# Patient Record
Sex: FEMALE | Race: BLACK OR AFRICAN AMERICAN | Employment: UNEMPLOYED | URBAN - METROPOLITAN AREA
[De-identification: names, ages, dates, MRNs, and addresses within clinical notes are randomized per-mention and may not be internally consistent; named-entity substitution may affect disease eponyms.]

---

## 2017-02-17 DIAGNOSIS — J20.9 ACUTE BRONCHITIS, UNSPECIFIED ORGANISM: Primary | ICD-10-CM

## 2017-03-24 ENCOUNTER — OFFICE VISIT (OUTPATIENT)
Dept: CARDIOLOGY CLINIC | Age: 52
End: 2017-03-24

## 2017-03-24 VITALS
WEIGHT: 217 LBS | HEIGHT: 65 IN | HEART RATE: 72 BPM | SYSTOLIC BLOOD PRESSURE: 120 MMHG | DIASTOLIC BLOOD PRESSURE: 68 MMHG | BODY MASS INDEX: 36.15 KG/M2

## 2017-03-24 DIAGNOSIS — E78.5 HYPERLIPIDEMIA, UNSPECIFIED HYPERLIPIDEMIA TYPE: ICD-10-CM

## 2017-03-24 DIAGNOSIS — R06.02 SHORTNESS OF BREATH: Primary | ICD-10-CM

## 2017-03-24 DIAGNOSIS — I10 ESSENTIAL HYPERTENSION: ICD-10-CM

## 2017-03-24 PROCEDURE — 99213 OFFICE O/P EST LOW 20 MIN: CPT | Performed by: NURSE PRACTITIONER

## 2017-03-24 RX ORDER — LOSARTAN POTASSIUM 50 MG/1
50 TABLET ORAL DAILY
Qty: 90 TABLET | Refills: 1 | Status: SHIPPED | OUTPATIENT
Start: 2017-03-24 | End: 2018-01-11 | Stop reason: SDUPTHER

## 2017-03-24 RX ORDER — METOPROLOL SUCCINATE 50 MG/1
50 TABLET, EXTENDED RELEASE ORAL DAILY
Qty: 90 TABLET | Refills: 1 | Status: SHIPPED | OUTPATIENT
Start: 2017-03-24 | End: 2018-01-11 | Stop reason: SDUPTHER

## 2017-03-24 RX ORDER — POTASSIUM CHLORIDE 20 MEQ/1
20 TABLET, EXTENDED RELEASE ORAL DAILY
Qty: 90 TABLET | Refills: 1 | Status: SHIPPED | OUTPATIENT
Start: 2017-03-24

## 2017-03-24 RX ORDER — PREGABALIN 75 MG/1
75 CAPSULE ORAL 2 TIMES DAILY
COMMUNITY
End: 2017-07-17

## 2017-03-24 RX ORDER — FUROSEMIDE 20 MG/1
20 TABLET ORAL DAILY
Qty: 90 TABLET | Refills: 1 | Status: SHIPPED | OUTPATIENT
Start: 2017-03-24 | End: 2017-05-16 | Stop reason: SDUPTHER

## 2017-04-07 ENCOUNTER — TELEPHONE (OUTPATIENT)
Dept: PULMONOLOGY | Age: 52
End: 2017-04-07

## 2017-04-07 DIAGNOSIS — J96.01 ACUTE RESPIRATORY FAILURE WITH HYPOXIA (HCC): Primary | ICD-10-CM

## 2017-05-14 DIAGNOSIS — R06.02 SHORTNESS OF BREATH: ICD-10-CM

## 2017-05-15 RX ORDER — FUROSEMIDE 40 MG/1
TABLET ORAL
Qty: 30 TABLET | Refills: 5 | OUTPATIENT
Start: 2017-05-15

## 2017-05-16 RX ORDER — FUROSEMIDE 20 MG/1
20 TABLET ORAL DAILY
Qty: 30 TABLET | Refills: 6 | Status: SHIPPED | OUTPATIENT
Start: 2017-05-16 | End: 2018-01-17 | Stop reason: SDUPTHER

## 2017-07-03 ENCOUNTER — OFFICE VISIT (OUTPATIENT)
Dept: PULMONOLOGY | Age: 52
End: 2017-07-03

## 2017-07-03 VITALS
SYSTOLIC BLOOD PRESSURE: 108 MMHG | BODY MASS INDEX: 36.15 KG/M2 | RESPIRATION RATE: 16 BRPM | OXYGEN SATURATION: 96 % | HEART RATE: 64 BPM | DIASTOLIC BLOOD PRESSURE: 70 MMHG | WEIGHT: 217 LBS | HEIGHT: 65 IN | TEMPERATURE: 97.7 F

## 2017-07-03 DIAGNOSIS — R59.0 MEDIASTINAL ADENOPATHY: ICD-10-CM

## 2017-07-03 DIAGNOSIS — R06.02 SHORTNESS OF BREATH: Primary | ICD-10-CM

## 2017-07-03 PROCEDURE — 99213 OFFICE O/P EST LOW 20 MIN: CPT | Performed by: INTERNAL MEDICINE

## 2017-07-03 RX ORDER — GABAPENTIN 600 MG/1
TABLET ORAL
COMMUNITY
Start: 2017-06-14 | End: 2017-09-11 | Stop reason: ALTCHOICE

## 2017-07-03 RX ORDER — IBUPROFEN 800 MG/1
TABLET ORAL
COMMUNITY
Start: 2017-04-17

## 2017-07-10 ENCOUNTER — HOSPITAL ENCOUNTER (OUTPATIENT)
Dept: PULMONOLOGY | Age: 52
Discharge: HOME | End: 2017-07-07

## 2017-07-17 ENCOUNTER — OFFICE VISIT (OUTPATIENT)
Dept: PULMONOLOGY | Age: 52
End: 2017-07-17

## 2017-07-17 VITALS
OXYGEN SATURATION: 91 % | HEART RATE: 76 BPM | SYSTOLIC BLOOD PRESSURE: 104 MMHG | BODY MASS INDEX: 35.65 KG/M2 | HEIGHT: 65 IN | TEMPERATURE: 97.8 F | DIASTOLIC BLOOD PRESSURE: 62 MMHG | RESPIRATION RATE: 16 BRPM | WEIGHT: 214 LBS

## 2017-07-17 DIAGNOSIS — G47.34 NOCTURNAL HYPOXEMIA: ICD-10-CM

## 2017-07-17 DIAGNOSIS — G47.10 HYPERSOMNIA: ICD-10-CM

## 2017-07-17 DIAGNOSIS — Z99.89 OSA ON CPAP: Primary | ICD-10-CM

## 2017-07-17 DIAGNOSIS — G47.33 OSA ON CPAP: Primary | ICD-10-CM

## 2017-07-17 PROCEDURE — 99213 OFFICE O/P EST LOW 20 MIN: CPT | Performed by: INTERNAL MEDICINE

## 2017-07-17 RX ORDER — ATORVASTATIN CALCIUM 10 MG/1
10 TABLET, FILM COATED ORAL DAILY
COMMUNITY
End: 2018-02-27

## 2017-07-17 ASSESSMENT — SLEEP AND FATIGUE QUESTIONNAIRES
HOW LIKELY ARE YOU TO NOD OFF OR FALL ASLEEP WHILE SITTING AND READING: 3
HOW LIKELY ARE YOU TO NOD OFF OR FALL ASLEEP IN A CAR, WHILE STOPPED FOR A FEW MINUTES IN TRAFFIC: 1
NECK CIRCUMFERENCE (INCHES): 14
HOW LIKELY ARE YOU TO NOD OFF OR FALL ASLEEP WHILE SITTING INACTIVE IN A PUBLIC PLACE: 0
HOW LIKELY ARE YOU TO NOD OFF OR FALL ASLEEP WHILE SITTING QUIETLY AFTER LUNCH WITHOUT ALCOHOL: 3
ESS TOTAL SCORE: 19
HOW LIKELY ARE YOU TO NOD OFF OR FALL ASLEEP WHILE SITTING AND TALKING TO SOMEONE: 3
HOW LIKELY ARE YOU TO NOD OFF OR FALL ASLEEP WHILE WATCHING TV: 3
HOW LIKELY ARE YOU TO NOD OFF OR FALL ASLEEP WHILE LYING DOWN TO REST IN THE AFTERNOON WHEN CIRCUMSTANCES PERMIT: 3
HOW LIKELY ARE YOU TO NOD OFF OR FALL ASLEEP WHEN YOU ARE A PASSENGER IN A CAR FOR AN HOUR WITHOUT A BREAK: 3

## 2017-07-24 ENCOUNTER — TELEPHONE (OUTPATIENT)
Dept: PULMONOLOGY | Age: 52
End: 2017-07-24

## 2017-07-27 ENCOUNTER — TELEPHONE (OUTPATIENT)
Dept: PULMONOLOGY | Age: 52
End: 2017-07-27

## 2017-07-27 NOTE — TELEPHONE ENCOUNTER
Called Natalie. Asked if she paid her balance with [employer] to get her bpap/O2 supplies. She said she called yesterday and was told she did not have a balance. I called Rachana Madrigal and spoke to [facility]. She said that Natalie has a balance of $93.38 and if she pays that amount they can send her bipap supplies out to her. She also indicated that she needs Oxygen testing as well. I told her I received the fax for overnight testing but this would need to be done on her bipap without O2 bleed in, in which case cannot be done until she gets back on her bipap. She agreed. I called Natalie back and told her that she has this balance and once she pays it they can send supplies to get her back on the bipap. Also explained the need for oxygen testing which needs to be done on her bipap without O2. Explained that the testing should be within 30 days of her appt she had on 7/17/17 with Dr Mylene Mcknight. She said she will call [facility] to take care of the balance.   I asked that she call us back to let us know the status

## 2017-08-07 NOTE — TELEPHONE ENCOUNTER
Spoke to Natalie to see if she has taken care of her balance with [employer]. She said she is going there today to take care of this.   Will let us know when this is done

## 2017-08-14 ENCOUNTER — HOSPITAL ENCOUNTER (OUTPATIENT)
Dept: PULMONOLOGY | Age: 52
Discharge: HOME | End: 2017-08-14
Attending: INTERNAL MEDICINE | Admitting: INTERNAL MEDICINE

## 2017-08-14 DIAGNOSIS — R59.0 MEDIASTINAL ADENOPATHY: ICD-10-CM

## 2017-08-14 DIAGNOSIS — J20.9 ACUTE BRONCHITIS, UNSPECIFIED ORGANISM: ICD-10-CM

## 2017-08-14 RX ORDER — ALBUTEROL SULFATE 90 UG/1
4 AEROSOL, METERED RESPIRATORY (INHALATION) ONCE
Status: COMPLETED | OUTPATIENT
Start: 2017-08-14 | End: 2017-08-14

## 2017-08-14 RX ADMIN — ALBUTEROL SULFATE 4 PUFF: 90 AEROSOL, METERED RESPIRATORY (INHALATION) at 09:55

## 2017-08-16 NOTE — TELEPHONE ENCOUNTER
Jayne calls back today stating when she call the collection agency, they hang up on her. I got her account number and called the collection agency at [phone] and talked to an agent who stated she will call Jayne and get her payment over the phone. I asked her to have Jayne call us back when this is completed.

## 2017-08-21 NOTE — TELEPHONE ENCOUNTER
Natalie called stating she straightened out her account with [employer] Ref# [id number]. I told her I would fax order to [employer] to get her the bpap supplies she needs to begin using her bpap again. Told her to notify us if she does not hear from them. Also told her that if she does get her supplies to contact us once she is using it regularly so we can send order for Overnight oximetry on bpap.   She said OK

## 2017-09-11 ENCOUNTER — OFFICE VISIT (OUTPATIENT)
Dept: CARDIOLOGY CLINIC | Age: 52
End: 2017-09-11

## 2017-09-11 VITALS
SYSTOLIC BLOOD PRESSURE: 114 MMHG | OXYGEN SATURATION: 97 % | WEIGHT: 223 LBS | HEART RATE: 90 BPM | DIASTOLIC BLOOD PRESSURE: 68 MMHG | BODY MASS INDEX: 37.15 KG/M2 | HEIGHT: 65 IN

## 2017-09-11 DIAGNOSIS — R60.0 BILATERAL EDEMA OF LOWER EXTREMITY: ICD-10-CM

## 2017-09-11 DIAGNOSIS — E78.2 MIXED HYPERLIPIDEMIA: ICD-10-CM

## 2017-09-11 DIAGNOSIS — I10 ESSENTIAL HYPERTENSION: ICD-10-CM

## 2017-09-11 DIAGNOSIS — R06.02 SOB (SHORTNESS OF BREATH): Primary | ICD-10-CM

## 2017-09-11 PROCEDURE — 99214 OFFICE O/P EST MOD 30 MIN: CPT | Performed by: INTERNAL MEDICINE

## 2017-10-02 NOTE — TELEPHONE ENCOUNTER
Called and spoke to Rebecca Rader again from [employer] since he did not call me back. He said that it still appears Natalie is still in collections. I referenced the reference number [id number] that Debbie Mckinney had given me 8/21/17 indicating she paid off her amount. He said on his end it shows she is still in collections. Said we could call the supply company to see about this since they were the ones who sent her to collections. I asked if the supply company was with [employer] and did they have the number to call. He said yes. I told him I did not feel this was my place to call about this and that since this was a [employer] issue it was their responsibility to look into this and contact us back as to where this stands. Expressed my extreme frustration with all of this since this has been ongoing without resolve since July. I want answers.   He said he would call back

## 2017-10-02 NOTE — TELEPHONE ENCOUNTER
Randy Stein from [facility] called back. Explained the history behind this situation.   She will look into this and call back

## 2017-10-03 NOTE — TELEPHONE ENCOUNTER
Bruce Goldberg from [place] called back. She said that it still shows Natalie in collections. Gave the number [phone] to the collections department. Called Natalie to have her call and see if she is still in collections and to call me back with this information.

## 2017-10-09 NOTE — TELEPHONE ENCOUNTER
Spoke to Natalie. She said that her bill is paid and they were supposed to call us. Told her I have not received any calls. It has been going on for 3 months now and it is up to her to find a resolve with Katelyn Adamson concerning this issue. Told her if she finds something out to call us back.

## 2017-10-11 ENCOUNTER — OFFICE VISIT (OUTPATIENT)
Dept: PULMONOLOGY | Age: 52
End: 2017-10-11

## 2017-10-11 VITALS
RESPIRATION RATE: 16 BRPM | SYSTOLIC BLOOD PRESSURE: 100 MMHG | OXYGEN SATURATION: 92 % | DIASTOLIC BLOOD PRESSURE: 68 MMHG | HEART RATE: 76 BPM | BODY MASS INDEX: 36.32 KG/M2 | HEIGHT: 65 IN | TEMPERATURE: 96.4 F | WEIGHT: 218 LBS

## 2017-10-11 DIAGNOSIS — R06.02 SOB (SHORTNESS OF BREATH): Primary | ICD-10-CM

## 2017-10-11 DIAGNOSIS — R59.0 MEDIASTINAL ADENOPATHY: ICD-10-CM

## 2017-10-11 PROCEDURE — 99214 OFFICE O/P EST MOD 30 MIN: CPT | Performed by: INTERNAL MEDICINE

## 2017-10-11 RX ORDER — ALBUTEROL SULFATE 90 UG/1
2 AEROSOL, METERED RESPIRATORY (INHALATION) EVERY 4 HOURS PRN
Qty: 1 INHALER | Refills: 5 | Status: SHIPPED | OUTPATIENT
Start: 2017-10-11 | End: 2021-06-09 | Stop reason: SDUPTHER

## 2017-10-11 NOTE — PROGRESS NOTES
metoprolol succinate (TOPROL XL) 50 MG extended release tablet Take 1 tablet by mouth daily 90 tablet 1    potassium chloride (KLOR-CON M) 20 MEQ extended release tablet Take 1 tablet by mouth daily 90 tablet 1    ferrous sulfate 325 (65 FE) MG tablet Take 325 mg by mouth daily (with breakfast)      Multiple Vitamins-Minerals (THERAPEUTIC MULTIVITAMIN-MINERALS) tablet Take 2 tablets by mouth daily      glipiZIDE (GLUCOTROL) 5 MG tablet Take 5 mg by mouth 2 times daily (before meals)      traZODone (DESYREL) 50 MG tablet Take 50 mg by mouth nightly      traMADol (ULTRAM) 50 MG tablet Take 50 mg by mouth 2 times daily as needed for Pain pain      BD ULTRA-FINE PEN NEEDLES 29G X 12.7MM MISC       INS SYRINGE/NEEDLE 1CC/29G ([employer] INS SYRINGE 1CC/29G) 29G X 1/2\" 1 ML MISC by Does not apply route. Use with insulin nightly. 100 each 2     No current facility-administered medications for this visit. Allergies   Allergen Reactions    Lisinopril Other (See Comments)     coughing    Shellfish-Derived Products        Family History   Problem Relation Age of Onset    Diabetes Sister     Diabetes Brother     Diabetes Mother     High Blood Pressure Father     Cancer Paternal Grandmother    Twin sister- asthma    Social History     Social History    Marital status:      Spouse name: N/A    Number of children: N/A    Years of education: N/A     Occupational History    Not on file.      Social History Main Topics    Smoking status: Never Smoker    Smokeless tobacco: Never Used    Alcohol use No    Drug use: No    Sexual activity: Not Currently      Comment:  1 DAUGHTER     Other Topics Concern    Not on file     Social History Narrative    No narrative on file       Immunization History   Administered Date(s) Administered    BCG 03/12/2010    Influenza Vaccine, unspecified formulation 09/14/2016, 12/16/2016    Influenza Virus Vaccine 10/06/2010, 10/24/2011, 01/23/2014, 10/05/2015    Influenza Whole 12/11/2007    MMR 05/24/2004    Meningococcal MCV4P (Menactra) 01/16/2011    Pneumococcal Polysaccharide ([id number]) 09/08/2014    Td 05/24/2004       ROS:  GENERAL:  No fevers, no chills  RESPIRATORY:  + exertional shortness of breath, +occasional cough      PHYSICAL EXAM:  Vitals:    10/11/17 1001   BP: 100/68   Pulse: 76   Resp: 16   Temp: 96.4 °F (35.8 °C)   SpO2: 92%   on RA    Gen: Well developed; well nourished  Eyes: No scleral icterus. No conjunctival injection. ENT:  Oropharynx clear. External appearance of ears and nose normal.  Neck: Trachea midline. No obvious mass. No visible thyroid enlargement    Respiratory: Clear to auscultation bilaterally, no accessory muscle use  Cardiovascular: Regular rate and rhythm, no appreciable murmurs. Trace bilateral lower extremity edema. Gastrointestinal: Soft, non-tender. No hernia  Skin: Warm and dry. No rashes or ulcers on visible areas. Normal texture and turgor  Lymphatic: No cervical LAD. No supraclavicular LAD. Musculoskeletal: No cyanosis, clubbing or joint deformity. Psychiatric: Normal mood and affect; exhibits normal insight and judgement     Pulmonary Function Testing (8/14/17)  FVC 1.45 L at 46% predicted ---> 1.51L at 48% predicted  FEV1 1.1 L at 44% predicted ----> 1.19L at 48% predicted  FEV1/FVC ratio at 76% ---->79% predicted  TLC 3.04 L at 57% predicted  VC 1.45L at 42% predicted  RV/TLC at 52% predicted  DLCO 12.5 at 47% predicted  DLCO/VA 5.86L at 147 % predicted    Overall: Proportionate reduction in flow measurements without significant reversal; moderate restriction; air trapping; moderate reduction in diffusing capacity that normalizes when averaged over lung volumes    Images and reports from chest imaging were reviewed by me. My interpretation is:  CXR (3/14/17): Hilar congestion; vascular prominence  Chest CT (8/14/17):  Prominent mediastinal and hilar nodes; tiny left upper lobe nodule (findings are unchanged from January 2014)     ECHO (3/15/17): Summary   Normal LV size and systolic function. Estimated ejection fraction is 60%.   The left atrium is normal in size.   Normal right ventricular size and function. Lab Results   Component Value Date    WBC 7.5 03/14/2017    HGB 10.8 (L) 03/14/2017    HCT 38.5 03/14/2017    MCV 74.7 (L) 03/14/2017     03/14/2017       Lab Results   Component Value Date    BNP 1,710 02/23/2013       Lab Results   Component Value Date    CREATININE 0.8 03/17/2017    BUN 27 (H) 03/17/2017     (L) 03/17/2017    K 3.9 03/17/2017    CL 88 (L) 03/17/2017    CO2 34 (H) 03/17/2017         Assessment/Plan:47y.o. year old female presents for follow up. Shortness of breath- PFTs show moderate restriction with air trapping. MVV is normal. Air trapping is likely due to asthma. She will continue albuterol inhaler as needed. I have asked her to call me if she has to use the albuterol inhaler more than twice in a week. Will obtain allergy testing and IgE. Mediastinal/hilar adenopathy- Stable compared to 2014. No further follow-up is needed. She will return for follow-up in 4 months.       Parag Hartman MD  [facility] Pulmonology, Critical Care and Sleep

## 2018-01-11 ENCOUNTER — OFFICE VISIT (OUTPATIENT)
Dept: PULMONOLOGY | Age: 53
End: 2018-01-11

## 2018-01-11 VITALS
WEIGHT: 220 LBS | TEMPERATURE: 98.2 F | HEART RATE: 73 BPM | RESPIRATION RATE: 18 BRPM | SYSTOLIC BLOOD PRESSURE: 100 MMHG | BODY MASS INDEX: 36.65 KG/M2 | HEIGHT: 65 IN | OXYGEN SATURATION: 96 % | DIASTOLIC BLOOD PRESSURE: 60 MMHG

## 2018-01-11 DIAGNOSIS — I10 ESSENTIAL HYPERTENSION: ICD-10-CM

## 2018-01-11 DIAGNOSIS — G47.33 OSA (OBSTRUCTIVE SLEEP APNEA): Primary | ICD-10-CM

## 2018-01-11 PROCEDURE — 99213 OFFICE O/P EST LOW 20 MIN: CPT | Performed by: INTERNAL MEDICINE

## 2018-01-11 RX ORDER — LOSARTAN POTASSIUM 50 MG/1
TABLET ORAL
Qty: 90 TABLET | Refills: 0 | Status: SHIPPED | OUTPATIENT
Start: 2018-01-11 | End: 2018-04-14 | Stop reason: SDUPTHER

## 2018-01-11 RX ORDER — METOPROLOL SUCCINATE 50 MG/1
TABLET, EXTENDED RELEASE ORAL
Qty: 90 TABLET | Refills: 0 | Status: SHIPPED | OUTPATIENT
Start: 2018-01-11 | End: 2018-04-14 | Stop reason: SDUPTHER

## 2018-01-11 ASSESSMENT — SLEEP AND FATIGUE QUESTIONNAIRES
HOW LIKELY ARE YOU TO NOD OFF OR FALL ASLEEP WHILE SITTING AND READING: 1
HOW LIKELY ARE YOU TO NOD OFF OR FALL ASLEEP WHILE WATCHING TV: 1
HOW LIKELY ARE YOU TO NOD OFF OR FALL ASLEEP WHILE SITTING INACTIVE IN A PUBLIC PLACE: 1
HOW LIKELY ARE YOU TO NOD OFF OR FALL ASLEEP IN A CAR, WHILE STOPPED FOR A FEW MINUTES IN TRAFFIC: 1
HOW LIKELY ARE YOU TO NOD OFF OR FALL ASLEEP WHEN YOU ARE A PASSENGER IN A CAR FOR AN HOUR WITHOUT A BREAK: 3
HOW LIKELY ARE YOU TO NOD OFF OR FALL ASLEEP WHILE SITTING AND TALKING TO SOMEONE: 1
HOW LIKELY ARE YOU TO NOD OFF OR FALL ASLEEP WHILE LYING DOWN TO REST IN THE AFTERNOON WHEN CIRCUMSTANCES PERMIT: 1
HOW LIKELY ARE YOU TO NOD OFF OR FALL ASLEEP WHILE SITTING QUIETLY AFTER LUNCH WITHOUT ALCOHOL: 1
ESS TOTAL SCORE: 10
NECK CIRCUMFERENCE (INCHES): 14.5

## 2018-01-11 NOTE — PROGRESS NOTES
 Pneumonia 2011    Pulmonary hypertension     Type II or unspecified type diabetes mellitus without mention of complication, not stated as uncontrolled        PAST SURGICAL HISTORY:  Past Surgical History:   Procedure Laterality Date     SECTION      SLEEVE GASTRECTOMY  14       FAMILY HISTORY:  family history includes Cancer in her paternal grandmother; Diabetes in her brother, mother, and sister; High Blood Pressure in her father. SOCIAL HISTORY:   reports that she has never smoked. She has never used smokeless tobacco.      ALLERGIES:  Patient is allergic to lisinopril and shellfish-derived products. REVIEW OF SYSTEMS:  Constitutional: Negative for fever    HENT: Negative for sore throat  Eyes: Negative for redness   Respiratory: Negative for dyspnea, cough  Cardiovascular: Negative for chest pain      Skin: Negative for rash  Neurological: Negative for syncope  Hematological: Negative for adenopathy  Psychiatric/Behavorial: Negative for anxiety    Objective:   PHYSICAL EXAM:  Blood pressure 100/60, pulse 73, temperature 98.2 °F (36.8 °C), temperature source Oral, resp. rate 18, height 5' 5\" (1.651 m), weight 220 lb (99.8 kg), SpO2 96 %, not currently breastfeeding.'  Gen: No distress. ENT: No discharge. Pharynx clear. External appearance of ears and nose normal. Mallampati  3     Resp: No accessory muscle use. No crackles. No wheezes. No rhonchi. CV: Regular rate. Regular rhythm. No murmur or rub. No edema. Neuro: Moves all four extremities. Psych: Oriented x 3. No anxiety. Awake. Alert. Intact judgement and insight.     Current Outpatient Prescriptions   Medication Sig Dispense Refill    losartan (COZAAR) 50 MG tablet TAKE ONE TABLET BY MOUTH DAILY 90 tablet 0    metoprolol succinate (TOPROL XL) 50 MG extended release tablet TAKE ONE TABLET BY MOUTH DAILY 90 tablet 0    albuterol sulfate HFA (PROAIR HFA) 108 (90 Base) MCG/ACT inhaler Inhale 2 puffs into the lungs every 4 hours as needed for Wheezing or Shortness of Breath 1 Inhaler 5    atorvastatin (LIPITOR) 10 MG tablet Take 10 mg by mouth daily      ibuprofen (ADVIL;MOTRIN) 800 MG tablet       aspirin 81 MG tablet Take 81 mg by mouth daily      furosemide (LASIX) 20 MG tablet Take 1 tablet by mouth daily 30 tablet 6    potassium chloride (KLOR-CON M) 20 MEQ extended release tablet Take 1 tablet by mouth daily 90 tablet 1    ferrous sulfate 325 (65 FE) MG tablet Take 325 mg by mouth daily (with breakfast)      Multiple Vitamins-Minerals (THERAPEUTIC MULTIVITAMIN-MINERALS) tablet Take 2 tablets by mouth daily      glipiZIDE (GLUCOTROL) 5 MG tablet Take 5 mg by mouth 2 times daily (before meals)      traZODone (DESYREL) 50 MG tablet Take 50 mg by mouth nightly      traMADol (ULTRAM) 50 MG tablet Take 50 mg by mouth 2 times daily as needed for Pain pain      BD ULTRA-FINE PEN NEEDLES 29G X 12.7MM MISC       INS SYRINGE/NEEDLE 1CC/29G ([employer] INS SYRINGE 1CC/29G) 29G X 1/2\" 1 ML MISC by Does not apply route. Use with insulin nightly. 100 each 2     No current facility-administered medications for this visit. Data Reviewed:   CBC and Renal reviewed  Last CBC  Lab Results   Component Value Date    WBC 7.5 03/14/2017    RBC 5.15 03/14/2017    HGB 10.8 03/14/2017    MCV 74.7 03/14/2017     03/14/2017     Last Renal  Lab Results   Component Value Date     03/17/2017    K 3.9 03/17/2017    CL 88 03/17/2017    CO2 34 03/17/2017    CO2 34 03/17/2017    CO2 37 03/16/2017    BUN 27 03/17/2017    CREATININE 0.8 03/17/2017    GLUCOSE 597 03/17/2017    CALCIUM 9.9 03/17/2017       Last ABG  POC Blood Gas:   Lab Results   Component Value Date    POCPH 7.23 02/23/2013    POCPCO2 113 02/23/2013    POCPO2 100 02/23/2013    POCHCO3 47.2 02/23/2013    ADBJ5CMD 95 02/23/2013     No results for input(s): PH, PCO2, PO2, HCO3, BE, O2SAT in the last 72 hours.        Radiology Review:  Pertinent images / reports were

## 2018-01-17 DIAGNOSIS — R06.02 SHORTNESS OF BREATH: ICD-10-CM

## 2018-01-18 RX ORDER — FUROSEMIDE 20 MG/1
TABLET ORAL
Qty: 30 TABLET | Refills: 5 | Status: SHIPPED | OUTPATIENT
Start: 2018-01-18

## 2018-01-31 DIAGNOSIS — I10 ESSENTIAL HYPERTENSION: ICD-10-CM

## 2018-02-01 RX ORDER — LOSARTAN POTASSIUM 50 MG/1
TABLET ORAL
Qty: 90 TABLET | Refills: 0 | Status: SHIPPED | OUTPATIENT
Start: 2018-02-01 | End: 2018-02-27 | Stop reason: SDUPTHER

## 2018-02-26 DIAGNOSIS — R06.02 SOB (SHORTNESS OF BREATH): ICD-10-CM

## 2018-02-27 ENCOUNTER — OFFICE VISIT (OUTPATIENT)
Dept: PULMONOLOGY | Age: 53
End: 2018-02-27

## 2018-02-27 ENCOUNTER — TELEPHONE (OUTPATIENT)
Dept: PULMONOLOGY | Age: 53
End: 2018-02-27

## 2018-02-27 VITALS
WEIGHT: 219 LBS | TEMPERATURE: 97 F | DIASTOLIC BLOOD PRESSURE: 71 MMHG | HEART RATE: 88 BPM | OXYGEN SATURATION: 97 % | BODY MASS INDEX: 36.49 KG/M2 | RESPIRATION RATE: 20 BRPM | HEIGHT: 65 IN | SYSTOLIC BLOOD PRESSURE: 106 MMHG

## 2018-02-27 DIAGNOSIS — R06.02 SOB (SHORTNESS OF BREATH): Primary | ICD-10-CM

## 2018-02-27 DIAGNOSIS — R06.02 SHORTNESS OF BREATH: Primary | ICD-10-CM

## 2018-02-27 PROCEDURE — 99212 OFFICE O/P EST SF 10 MIN: CPT | Performed by: INTERNAL MEDICINE

## 2018-02-27 RX ORDER — FLUTICASONE FUROATE AND VILANTEROL 200; 25 UG/1; UG/1
200 POWDER RESPIRATORY (INHALATION) DAILY
Qty: 1 EACH | Refills: 5 | Status: SHIPPED | OUTPATIENT
Start: 2018-02-27 | End: 2018-09-05 | Stop reason: SDUPTHER

## 2018-02-27 RX ORDER — ALBUTEROL SULFATE 2.5 MG/3ML
2.5 SOLUTION RESPIRATORY (INHALATION) EVERY 4 HOURS PRN
Qty: 180 ML | Refills: 5 | Status: SHIPPED | OUTPATIENT
Start: 2018-02-27

## 2018-02-27 RX ORDER — FLUTICASONE FUROATE AND VILANTEROL 200; 25 UG/1; UG/1
1 POWDER RESPIRATORY (INHALATION) DAILY
Qty: 1 EACH | Refills: 0 | Status: SHIPPED | OUTPATIENT
Start: 2018-02-27 | End: 2018-03-22 | Stop reason: SDUPTHER

## 2018-02-27 NOTE — PROGRESS NOTES
Chief complaint  This is a 47y.o. year old female  who presents with a chief complaint of   Chief Complaint   Patient presents with    Shortness of Breath       HPI  64-year-old woman with pulmonary embolus (greater than 5 years ago per patient), obstructive sleep apnea (on CPAP) and obesity (status post gastric sleeve in ) presents for follow-up of shortness of breath. She says she has used her albuterol inhaler twice a day for the past 2 days. Previously she had not used the inhaler for a long time. She says recently she had a cough and took over-the-counter cough medicines. She no longer has a cough. She denies nighttime awakenings due to cough or shortness of breath. She is able to do her activities.     Past Medical History:   Diagnosis Date    Anemia     Chronic back pain     DVT     Hyperlipidemia     Hypertension     Lumbar disc disease     Morbid obesity with BMI of 40.0-44.9, adult ([facility]) 2013    JOHN on CPAP     Oxygen desaturation during sleep     uSES 3 LITERS WITH CPAP AT NIGHT    PE (pulmonary embolism)     Personal history of PE (pulmonary embolism)     Pneumonia     Pulmonary hypertension     Type II or unspecified type diabetes mellitus without mention of complication, not stated as uncontrolled        Past Surgical History:   Procedure Laterality Date     SECTION      SLEEVE GASTRECTOMY  14       Current Outpatient Prescriptions   Medication Sig Dispense Refill    Fluticasone Furoate-Vilanterol (BREO ELLIPTA) 200-25 MCG/INH AEPB Inhale 200 mcg into the lungs daily One puff daily 1 each 5    furosemide (LASIX) 20 MG tablet TAKE ONE TABLET BY MOUTH DAILY 30 tablet 5    losartan (COZAAR) 50 MG tablet TAKE ONE TABLET BY MOUTH DAILY 90 tablet 0    metoprolol succinate (TOPROL XL) 50 MG extended release tablet TAKE ONE TABLET BY MOUTH DAILY 90 tablet 0    albuterol sulfate HFA (PROAIR HFA) 108 (90 Base) MCG/ACT inhaler Inhale 2 puffs into the lungs

## 2018-02-28 LAB
ALLERGEN ASPERGILLUS ALTERNATA IGE: <0.1 KU/L
ALLERGEN ASPERGILLUS FUMIGATUS IGE: <0.1 KU/L
ALLERGEN BERMUDA GRASS IGE: <0.1 KU/L
ALLERGEN BIRCH IGE: <0.1 KU/L
ALLERGEN CAT DANDER IGE: 2.24 KU/L
ALLERGEN COMMON SHORT RAGWEED IGE: 0.58 KU/L
ALLERGEN COTTONWOOD: 0.16 KU/L
ALLERGEN COW MILK IGE: 0.1 KU/L
ALLERGEN DOG DANDER IGE: 0.68 KU/L
ALLERGEN ELM IGE: <0.1 KU/L
ALLERGEN FUNGI/MOLD M.RACEMOSUS IGE: <0.1 KU/L
ALLERGEN GERMAN COCKROACH IGE: 0.64 KU/L
ALLERGEN HORMODENDRUM HORDEI IGE: <0.1 KU/L
ALLERGEN MAPLE/BOX ELDER IGE: 0.62 KU/L
ALLERGEN MITE DUST FARINAE IGE: 10.6 KU/L
ALLERGEN MITE DUST PTERONYSSINUS IGE: 6.51 KU/L
ALLERGEN MOUNTAIN CEDAR: 0.14 KU/L
ALLERGEN MOUSE EPITHELIA IGE: <0.1 KU/L
ALLERGEN OAK TREE IGE: <0.1 KU/L
ALLERGEN PEANUT (F13) IGE: <0.1 KU/L
ALLERGEN PECAN TREE IGE: 0.27 KU/L
ALLERGEN PENICILLIUM NOTATUM: 0.19 KU/L
ALLERGEN ROUGH PIGWEED (W14) IGE: <0.1 KU/L
ALLERGEN RUSSIAN THISTLE IGE: <0.1 KU/L
ALLERGEN SEE NOTE: NORMAL
ALLERGEN SHEEP SORREL (W18) IGE: <0.1 KU/L
ALLERGEN TIMOTHY GRASS: 0.11 KU/L
ALLERGEN TREE SYCAMORE: 1.62 KU/L
ALLERGEN WALNUT TREE IGE: <0.1 KU/L
ALLERGEN WHITE MULBERRY TREE, IGE: <0.1 KU/L
ALLERGEN, TREE, WHITE ASH IGE: <0.1 KU/L
IGE: 501 KU/L

## 2018-03-05 ENCOUNTER — TELEPHONE (OUTPATIENT)
Dept: PULMONOLOGY | Age: 53
End: 2018-03-05

## 2018-04-03 ENCOUNTER — HOSPITAL ENCOUNTER (OUTPATIENT)
Dept: PULMONOLOGY | Age: 53
Discharge: HOME | End: 2018-04-03
Attending: INTERNAL MEDICINE | Admitting: INTERNAL MEDICINE

## 2018-04-03 DIAGNOSIS — R06.02 SOB (SHORTNESS OF BREATH): ICD-10-CM

## 2018-04-03 PROCEDURE — 94060 EVALUATION OF WHEEZING: CPT | Performed by: INTERNAL MEDICINE

## 2018-04-03 PROCEDURE — 94729 DIFFUSING CAPACITY: CPT | Performed by: INTERNAL MEDICINE

## 2018-04-03 PROCEDURE — 94727 GAS DIL/WSHOT DETER LNG VOL: CPT | Performed by: INTERNAL MEDICINE

## 2018-04-03 RX ORDER — ALBUTEROL SULFATE 90 UG/1
4 AEROSOL, METERED RESPIRATORY (INHALATION) ONCE
Status: COMPLETED | OUTPATIENT
Start: 2018-04-03 | End: 2018-04-03

## 2018-04-03 RX ADMIN — ALBUTEROL SULFATE 4 PUFF: 90 AEROSOL, METERED RESPIRATORY (INHALATION) at 09:57

## 2018-04-14 DIAGNOSIS — I10 ESSENTIAL HYPERTENSION: ICD-10-CM

## 2018-04-17 RX ORDER — LOSARTAN POTASSIUM 50 MG/1
TABLET ORAL
Qty: 90 TABLET | Refills: 3 | Status: SHIPPED | OUTPATIENT
Start: 2018-04-17 | End: 2019-05-16 | Stop reason: SDUPTHER

## 2018-04-17 RX ORDER — METOPROLOL SUCCINATE 50 MG/1
TABLET, EXTENDED RELEASE ORAL
Qty: 90 TABLET | Refills: 3 | Status: SHIPPED | OUTPATIENT
Start: 2018-04-17 | End: 2020-12-16

## 2018-04-27 ENCOUNTER — OFFICE VISIT (OUTPATIENT)
Dept: CARDIOLOGY CLINIC | Age: 53
End: 2018-04-27

## 2018-04-27 VITALS
DIASTOLIC BLOOD PRESSURE: 76 MMHG | OXYGEN SATURATION: 90 % | WEIGHT: 231 LBS | SYSTOLIC BLOOD PRESSURE: 108 MMHG | BODY MASS INDEX: 38.49 KG/M2 | HEIGHT: 65 IN

## 2018-04-27 DIAGNOSIS — E78.5 HYPERLIPIDEMIA, UNSPECIFIED HYPERLIPIDEMIA TYPE: ICD-10-CM

## 2018-04-27 DIAGNOSIS — I10 ESSENTIAL HYPERTENSION: ICD-10-CM

## 2018-04-27 DIAGNOSIS — R60.0 BILATERAL EDEMA OF LOWER EXTREMITY: ICD-10-CM

## 2018-04-27 DIAGNOSIS — R06.02 SHORTNESS OF BREATH: Primary | ICD-10-CM

## 2018-04-27 DIAGNOSIS — G47.33 OSA (OBSTRUCTIVE SLEEP APNEA): ICD-10-CM

## 2018-04-27 PROCEDURE — 93000 ELECTROCARDIOGRAM COMPLETE: CPT | Performed by: NURSE PRACTITIONER

## 2018-04-27 PROCEDURE — 99214 OFFICE O/P EST MOD 30 MIN: CPT | Performed by: NURSE PRACTITIONER

## 2018-09-05 ENCOUNTER — OFFICE VISIT (OUTPATIENT)
Dept: PULMONOLOGY | Age: 53
End: 2018-09-05

## 2018-09-05 VITALS
HEIGHT: 65 IN | WEIGHT: 229.6 LBS | RESPIRATION RATE: 16 BRPM | TEMPERATURE: 97.8 F | OXYGEN SATURATION: 99 % | SYSTOLIC BLOOD PRESSURE: 116 MMHG | DIASTOLIC BLOOD PRESSURE: 73 MMHG | BODY MASS INDEX: 38.25 KG/M2 | HEART RATE: 79 BPM

## 2018-09-05 DIAGNOSIS — J45.20 MILD INTERMITTENT ASTHMA WITHOUT COMPLICATION: Primary | ICD-10-CM

## 2018-09-05 DIAGNOSIS — Z91.09 ENVIRONMENTAL ALLERGIES: ICD-10-CM

## 2018-09-05 PROCEDURE — 99214 OFFICE O/P EST MOD 30 MIN: CPT | Performed by: INTERNAL MEDICINE

## 2018-09-05 RX ORDER — FLUTICASONE FUROATE AND VILANTEROL 200; 25 UG/1; UG/1
200 POWDER RESPIRATORY (INHALATION) DAILY
Qty: 1 EACH | Refills: 5 | Status: SHIPPED | OUTPATIENT
Start: 2018-09-05 | End: 2019-03-03 | Stop reason: SDUPTHER

## 2018-09-05 RX ORDER — MONTELUKAST SODIUM 10 MG/1
10 TABLET ORAL NIGHTLY
Qty: 30 TABLET | Refills: 5 | Status: SHIPPED | OUTPATIENT
Start: 2018-09-05 | End: 2019-03-17 | Stop reason: SDUPTHER

## 2018-09-05 NOTE — PROGRESS NOTES
Chief complaint  This is a 47y.o. year old female  who presents with a chief complaint of   Chief Complaint   Patient presents with    Follow-up     SOB       HPI  60-year-old woman with probable asthma, pulmonary embolus (greater than 5 years ago per patient), obstructive sleep apnea (on CPAP) and obesity (status post gastric sleeve in ) presents for follow-up. She was started on Breo at her last visit. She feels that this has helped her breathing. She recently went to [place] and was able to walk all day without dyspnea. She has been without Breo for the last 2 weeks. She says now she is starting to notice some mild dyspnea with exertion. She denies cough, but has post-nasal drip. She has used albuterol twice in the past 2 weeks.     Past Medical History:   Diagnosis Date    Anemia     Chronic back pain     DVT     Hyperlipidemia     Hypertension     Lumbar disc disease     Morbid obesity with BMI of 40.0-44.9, adult ([facility] [address] 2013    JOHN on CPAP     Oxygen desaturation during sleep     uSES 3 LITERS WITH CPAP AT NIGHT    PE (pulmonary embolism)     Personal history of PE (pulmonary embolism)     Pneumonia     Pulmonary hypertension (HCC)     Type II or unspecified type diabetes mellitus without mention of complication, not stated as uncontrolled        Past Surgical History:   Procedure Laterality Date     SECTION      SLEEVE GASTRECTOMY  14       Current Outpatient Prescriptions   Medication Sig Dispense Refill    metoprolol succinate (TOPROL XL) 50 MG extended release tablet TAKE ONE TABLET BY MOUTH DAILY 90 tablet 3    losartan (COZAAR) 50 MG tablet TAKE ONE TABLET BY MOUTH DAILY 90 tablet 3    Fluticasone Furoate-Vilanterol (BREO ELLIPTA) 200-25 MCG/INH AEPB Inhale 200 mcg into the lungs daily One puff daily 1 each 5    furosemide (LASIX) 20 MG tablet TAKE ONE TABLET BY MOUTH DAILY 30 tablet 5    albuterol sulfate HFA (PROAIR HFA) 108 (90 Base) MCG/ACT inhaler capacity has significantly improved, otherwise there has been no significant change)    Pulmonary Function Testing (8/14/17)  FVC 1.45 L at 46% predicted ---> 1.51L at 48% predicted  FEV1 1.1 L at 44% predicted ----> 1.19L at 48% predicted  FEV1/FVC ratio at 76% ---->79% predicted  TLC 3.04 L at 57% predicted  VC 1.45L at 42% predicted  RV/TLC at 52% predicted  DLCO 12.5 at 47% predicted  DLCO/VA 5.86L at 147 % predicted     Overall: Proportionate reduction in flow measurements without significant reversal; moderate restriction; air trapping; moderate reduction in diffusing capacity that normalizes when averaged over lung volumes     Images and reports from chest imaging were reviewed by me. My interpretation is:  CXR (3/14/17): Hilar congestion; vascular prominence  Chest CT (8/14/17): Prominent mediastinal and hilar nodes; tiny left upper lobe nodule (findings are unchanged from January 2014)     ECHO (3/15/17): Summary   Normal LV size and systolic function. Estimated ejection fraction is 60%.   The left atrium is normal in size.   Normal right ventricular size and function. Lab Results   Component Value Date    WBC 7.5 03/14/2017    HGB 10.8 (L) 03/14/2017    HCT 38.5 03/14/2017    MCV 74.7 (L) 03/14/2017     03/14/2017       Lab Results   Component Value Date    BNP 1,710 02/23/2013       Lab Results   Component Value Date    CREATININE 0.8 03/17/2017    BUN 27 (H) 03/17/2017     (L) 03/17/2017    K 3.9 03/17/2017    CL 88 (L) 03/17/2017    CO2 34 (H) 03/17/2017   IgE- 501  Allergies to cats, dogs, cockroaches, dust mites, box elder trees, [place] trees and ragweed      Assessment/Plan:47y.o. year old female presents for follow up. Asthma- Continue Breo 200/25 µg daily. Albuterol inhaler and nebulizers as needed. Environmental allergies- Singulair nightly. She will return for follow-up in 4 months.       Kassie Chandler MD  [facility] Pulmonology, Critical Care and Sleep

## 2019-02-27 ENCOUNTER — OFFICE VISIT (OUTPATIENT)
Dept: PULMONOLOGY | Age: 54
End: 2019-02-27
Payer: COMMERCIAL

## 2019-02-27 VITALS
HEIGHT: 65 IN | BODY MASS INDEX: 38.75 KG/M2 | TEMPERATURE: 96.7 F | WEIGHT: 232.6 LBS | RESPIRATION RATE: 16 BRPM | SYSTOLIC BLOOD PRESSURE: 114 MMHG | HEART RATE: 92 BPM | DIASTOLIC BLOOD PRESSURE: 68 MMHG | OXYGEN SATURATION: 97 %

## 2019-02-27 DIAGNOSIS — Z91.09 ENVIRONMENTAL ALLERGIES: ICD-10-CM

## 2019-02-27 DIAGNOSIS — G47.33 OSA (OBSTRUCTIVE SLEEP APNEA): ICD-10-CM

## 2019-02-27 DIAGNOSIS — J45.20 MILD INTERMITTENT ASTHMA WITHOUT COMPLICATION: Primary | ICD-10-CM

## 2019-02-27 DIAGNOSIS — R05.9 COUGH: ICD-10-CM

## 2019-02-27 PROCEDURE — 99214 OFFICE O/P EST MOD 30 MIN: CPT | Performed by: INTERNAL MEDICINE

## 2019-02-27 RX ORDER — FLUTICASONE PROPIONATE 50 MCG
1 SPRAY, SUSPENSION (ML) NASAL DAILY
Qty: 2 BOTTLE | Refills: 1 | Status: SHIPPED | OUTPATIENT
Start: 2019-02-27 | End: 2019-11-06

## 2019-05-16 DIAGNOSIS — I10 ESSENTIAL HYPERTENSION: ICD-10-CM

## 2019-05-20 RX ORDER — LOSARTAN POTASSIUM 50 MG/1
TABLET ORAL
Qty: 90 TABLET | Refills: 0 | Status: SHIPPED | OUTPATIENT
Start: 2019-05-20 | End: 2019-09-21 | Stop reason: SDUPTHER

## 2019-09-21 DIAGNOSIS — I10 ESSENTIAL HYPERTENSION: ICD-10-CM

## 2019-09-23 RX ORDER — LOSARTAN POTASSIUM 50 MG/1
TABLET ORAL
Qty: 90 TABLET | Refills: 0 | Status: SHIPPED | OUTPATIENT
Start: 2019-09-23

## 2019-11-06 ENCOUNTER — OFFICE VISIT (OUTPATIENT)
Dept: PULMONOLOGY | Age: 54
End: 2019-11-06
Payer: COMMERCIAL

## 2019-11-06 VITALS
DIASTOLIC BLOOD PRESSURE: 74 MMHG | BODY MASS INDEX: 40.65 KG/M2 | OXYGEN SATURATION: 95 % | HEIGHT: 65 IN | HEART RATE: 73 BPM | SYSTOLIC BLOOD PRESSURE: 134 MMHG | WEIGHT: 244 LBS

## 2019-11-06 DIAGNOSIS — Z91.09 ENVIRONMENTAL ALLERGIES: ICD-10-CM

## 2019-11-06 DIAGNOSIS — J45.20 MILD INTERMITTENT ASTHMA WITHOUT COMPLICATION: Primary | ICD-10-CM

## 2019-11-06 DIAGNOSIS — G47.33 OSA (OBSTRUCTIVE SLEEP APNEA): ICD-10-CM

## 2019-11-06 PROCEDURE — 99213 OFFICE O/P EST LOW 20 MIN: CPT | Performed by: INTERNAL MEDICINE

## 2019-11-06 RX ORDER — FLUTICASONE FUROATE AND VILANTEROL 200; 25 UG/1; UG/1
1 POWDER RESPIRATORY (INHALATION) DAILY
Qty: 1 EACH | Refills: 5 | Status: SHIPPED | OUTPATIENT
Start: 2019-11-06 | End: 2020-06-04

## 2020-04-07 RX ORDER — MONTELUKAST SODIUM 10 MG/1
10 TABLET ORAL NIGHTLY
Qty: 30 TABLET | Refills: 5 | Status: SHIPPED | OUTPATIENT
Start: 2020-04-07 | End: 2020-04-14

## 2020-06-24 ENCOUNTER — OFFICE VISIT (OUTPATIENT)
Dept: PULMONOLOGY | Age: 55
End: 2020-06-24
Payer: COMMERCIAL

## 2020-06-24 VITALS
SYSTOLIC BLOOD PRESSURE: 118 MMHG | TEMPERATURE: 98.5 F | RESPIRATION RATE: 16 BRPM | BODY MASS INDEX: 39.32 KG/M2 | HEIGHT: 65 IN | DIASTOLIC BLOOD PRESSURE: 70 MMHG | OXYGEN SATURATION: 100 % | HEART RATE: 84 BPM | WEIGHT: 236 LBS

## 2020-06-24 PROCEDURE — 99212 OFFICE O/P EST SF 10 MIN: CPT | Performed by: INTERNAL MEDICINE

## 2020-06-24 ASSESSMENT — ASTHMA QUESTIONNAIRES
QUESTION_5 LAST FOUR WEEKS HOW WOULD YOU RATE YOUR ASTHMA CONTROL: 4
QUESTION_1 LAST FOUR WEEKS HOW MUCH OF THE TIME DID YOUR ASTHMA KEEP YOU FROM GETTING AS MUCH DONE AT WORK, SCHOOL OR AT HOME: 5
QUESTION_2 LAST FOUR WEEKS HOW OFTEN HAVE YOU HAD SHORTNESS OF BREATH: 4
QUESTION_4 LAST FOUR WEEKS HOW OFTEN HAVE YOU USED YOUR RESCUE INHALER OR NEBULIZER MEDICATION (SUCH AS ALBUTEROL): 4
QUESTION_3 LAST FOUR WEEKS HOW OFTEN DID YOUR ASTHMA SYMPTOMS (WHEEZING, COUGHING, SHORTNESS OF BREATH, CHEST TIGHTNESS OR PAIN) WAKE YOU UP AT NIGHT OR EARLIER THAN USUAL IN THE MORNING: 5
ACT_TOTALSCORE: 22

## 2020-06-24 NOTE — PROGRESS NOTES
January 2014)     ECHO (3/15/17): Summary   Normal LV size and systolic function. Estimated ejection fraction is 60%.   The left atrium is normal in size.   Normal right ventricular size and function. Lab Results   Component Value Date    WBC 7.5 03/14/2017    HGB 10.8 (L) 03/14/2017    HCT 38.5 03/14/2017    MCV 74.7 (L) 03/14/2017     03/14/2017       Lab Results   Component Value Date    BNP 1,710 02/23/2013       Lab Results   Component Value Date    CREATININE 0.8 03/17/2017    BUN 27 (H) 03/17/2017     (L) 03/17/2017    K 3.9 03/17/2017    CL 88 (L) 03/17/2017    CO2 34 (H) 03/17/2017   IgE- 501  Allergies to cats, dogs, cockroaches, dust mites, box elder trees, [place] trees and ragweed      Assessment/Plan:48y.o. year old female presents for follow up. Asthma- Mild intermittent. Continue Breo 200/25 mcg daily. Continue albuterol inhaler and nebulizers as needed. Environmental allergies- We discussed that she should discontinue Singulair given the new FDA warning and that she may use an over-the-counter oral antihistamine instead if needed. Obstructive sleep apnea- On CPAP with 3 L bleed in oxygen. She follows with Dr. Nevaeh Morris. She will return for follow-up in 6 months.       Valdez Hinkle MD  [facility] Pulmonology, Critical Care and Sleep

## 2020-12-16 ENCOUNTER — OFFICE VISIT (OUTPATIENT)
Dept: PULMONOLOGY | Age: 55
End: 2020-12-16
Payer: COMMERCIAL

## 2020-12-16 VITALS
HEART RATE: 65 BPM | TEMPERATURE: 97.3 F | BODY MASS INDEX: 40.05 KG/M2 | RESPIRATION RATE: 12 BRPM | SYSTOLIC BLOOD PRESSURE: 128 MMHG | HEIGHT: 65 IN | DIASTOLIC BLOOD PRESSURE: 78 MMHG | WEIGHT: 240.4 LBS | OXYGEN SATURATION: 91 %

## 2020-12-16 PROCEDURE — 99213 OFFICE O/P EST LOW 20 MIN: CPT | Performed by: INTERNAL MEDICINE

## 2020-12-16 RX ORDER — FLUTICASONE FUROATE 200 UG/1
1 POWDER RESPIRATORY (INHALATION) DAILY
Qty: 1 EACH | Refills: 5 | Status: SHIPPED
Start: 2020-12-16 | End: 2021-06-09 | Stop reason: ALTCHOICE

## 2020-12-16 RX ORDER — FLUTICASONE FUROATE 200 UG/1
1 POWDER RESPIRATORY (INHALATION) DAILY
Qty: 30 EACH | Refills: 3 | Status: CANCELLED | OUTPATIENT
Start: 2020-12-16

## 2020-12-16 ASSESSMENT — ASTHMA QUESTIONNAIRES
QUESTION_5 LAST FOUR WEEKS HOW WOULD YOU RATE YOUR ASTHMA CONTROL: 3
QUESTION_1 LAST FOUR WEEKS HOW MUCH OF THE TIME DID YOUR ASTHMA KEEP YOU FROM GETTING AS MUCH DONE AT WORK, SCHOOL OR AT HOME: 5
QUESTION_4 LAST FOUR WEEKS HOW OFTEN HAVE YOU USED YOUR RESCUE INHALER OR NEBULIZER MEDICATION (SUCH AS ALBUTEROL): 5
QUESTION_2 LAST FOUR WEEKS HOW OFTEN HAVE YOU HAD SHORTNESS OF BREATH: 4
ACT_TOTALSCORE: 22
QUESTION_3 LAST FOUR WEEKS HOW OFTEN DID YOUR ASTHMA SYMPTOMS (WHEEZING, COUGHING, SHORTNESS OF BREATH, CHEST TIGHTNESS OR PAIN) WAKE YOU UP AT NIGHT OR EARLIER THAN USUAL IN THE MORNING: 5

## 2020-12-16 NOTE — PROGRESS NOTES
Chief complaint  This is a 48y.o. year old female  who presents with a chief complaint of   Chief Complaint   Patient presents with    Asthma       HPI  27-year-old woman with asthma, environmental allergies, pulmonary embolus (in ), obstructive sleep apnea and obesity (status post gastric sleeve in ) presents for follow-up. She denies cough or shortness of breath. She denies nighttime awakenings. She is using Breo daily. She has not had to use albuterol inhaler or nebulizers recently. She is no longer taking Singulair. She uses CPAP with 3 L of oxygen bleed in.     Past Medical History:   Diagnosis Date    Anemia     Chronic back pain     DVT     Hyperlipidemia     Hypertension     Lumbar disc disease     Morbid obesity with BMI of 40.0-44.9, adult ([facility] [address] 2013    JOHN on CPAP     Oxygen desaturation during sleep     uSES 3 LITERS WITH CPAP AT NIGHT    PE (pulmonary embolism)     Personal history of PE (pulmonary embolism)     Pneumonia     Pulmonary hypertension (HCC)     Type II or unspecified type diabetes mellitus without mention of complication, not stated as uncontrolled        Past Surgical History:   Procedure Laterality Date     SECTION      SLEEVE GASTRECTOMY  14       Current Outpatient Medications   Medication Sig Dispense Refill    fluticasone (ARNUITY ELLIPTA) 200 MCG/ACT AEPB Inhale 1 puff into the lungs daily 1 each 5    BREO ELLIPTA 200-25 MCG/INH AEPB inhaler INHALE ONE DOSE BY MOUTH DAILY 60 each 4    losartan (COZAAR) 50 MG tablet TAKE ONE TABLET BY MOUTH DAILY 90 tablet 0    MELOXICAM PO Take 1 tablet by mouth daily      NONFORMULARY Back medication for pain once daily      albuterol (PROVENTIL) (2.5 MG/3ML) 0.083% nebulizer solution Take 3 mLs by nebulization every 4 hours as needed for Wheezing or Shortness of Breath 180 mL 5    furosemide (LASIX) 20 MG tablet TAKE ONE TABLET BY MOUTH DAILY 30 tablet 5    albuterol sulfate HFA (PROAIR HFA) 108 (90 Base) MCG/ACT inhaler Inhale 2 puffs into the lungs every 4 hours as needed for Wheezing or Shortness of Breath 1 Inhaler 5    ibuprofen (ADVIL;MOTRIN) 800 MG tablet       aspirin 81 MG tablet Take 81 mg by mouth daily      potassium chloride (KLOR-CON M) 20 MEQ extended release tablet Take 1 tablet by mouth daily 90 tablet 1    glipiZIDE (GLUCOTROL) 5 MG tablet Take 5 mg by mouth 2 times daily (before meals)      traZODone (DESYREL) 50 MG tablet Take 50 mg by mouth nightly      BD ULTRA-FINE PEN NEEDLES 29G X 12.7MM MISC       INS SYRINGE/NEEDLE 1CC/29G ([employer] INS SYRINGE 1CC/29G) 29G X 1/2\" 1 ML MISC by Does not apply route. Use with insulin nightly. 100 each 2    ferrous sulfate 325 (65 FE) MG tablet Take 325 mg by mouth daily (with breakfast)      Multiple Vitamins-Minerals (THERAPEUTIC MULTIVITAMIN-MINERALS) tablet Take 2 tablets by mouth daily      traMADol (ULTRAM) 50 MG tablet Take 50 mg by mouth 2 times daily as needed for Pain pain       No current facility-administered medications for this visit.         Allergies   Allergen Reactions    Lisinopril Other (See Comments)     coughing    Shellfish-Derived Products        Family History   Problem Relation Age of Onset    Diabetes Sister     Diabetes Brother     Diabetes Mother     High Blood Pressure Father     Cancer Paternal Grandmother        Social History     Socioeconomic History    Marital status:      Spouse name: Not on file    Number of children: Not on file    Years of education: Not on file    Highest education level: Not on file   Occupational History    Not on file   Social Needs    Financial resource strain: Not on file    Food insecurity     Worry: Not on file     Inability: Not on file    Transportation needs     Medical: Not on file     Non-medical: Not on file   Tobacco Use    Smoking status: Never Smoker    Smokeless tobacco: Never Used   Substance and Sexual Activity    Alcohol use: No    Drug use: No    Sexual activity: Not Currently     Comment:  1 DAUGHTER   Lifestyle    Physical activity     Days per week: Not on file     Minutes per session: Not on file    Stress: Not on file   Relationships    Social connections     Talks on phone: Not on file     Gets together: Not on file     Attends Gnosticist service: Not on file     Active member of club or organization: Not on file     Attends meetings of clubs or organizations: Not on file     Relationship status: Not on file    Intimate partner violence     Fear of current or ex partner: Not on file     Emotionally abused: Not on file     Physically abused: Not on file     Forced sexual activity: Not on file   Other Topics Concern    Not on file   Social History Narrative    Not on file       Immunization History   Administered Date(s) Administered    BCG (Fito BCG) 03/12/2010    Influenza 10/06/2010, 10/24/2011, 10/05/2015    Influenza Vaccine, unspecified formulation 09/14/2016, 12/16/2016    Influenza Virus Vaccine 01/23/2014    Influenza Whole 12/11/2007    MMR 05/24/2004    Meningococcal MCV4P (Menactra) 01/16/2011    Pneumococcal Polysaccharide (Ppixnitke19) 09/08/2014    Td, unspecified formulation 05/24/2004       ROS:  GENERAL:  No fevers, no chills, +4lb weight gain in 6 months   RESPIRATORY:  No shortness of breath, no wheezing, no cough      PHYSICAL EXAM:  Vitals:    12/16/20 0800   BP: 128/78   Pulse: 65   Resp: 12   Temp: 97.3 °F (36.3 °C)   SpO2: 91%   on RA    Gen: Well developed; well nourished  Eyes: No scleral icterus. No conjunctival injection. ENT:  Oropharynx clear. External appearance of ears and nose normal.  Neck: Trachea midline. No obvious mass. No visible thyroid enlargement    Respiratory: Clear to auscultation bilaterally, no accessory muscle use  Cardiovascular: Regular rate and rhythm, no appreciable murmurs. No edema. Gastrointestinal: Soft, non-tender. No hernia  Skin: Warm and dry.  No rashes or ulcers on visible areas. Normal texture and turgor  Lymphatic: No cervical LAD. No supraclavicular LAD. Musculoskeletal: No cyanosis, clubbing or joint deformity. Psychiatric: Normal mood and affect; exhibits normal insight and judgement     Data reviewed:  Pulmonary Function Testing (4/3/18)  FVC 1.6 L at 54% predicted ---> 1.68 (56%)  FEV1 1.29 L at 54% predicted----> 1.32 (55%)  FEV1/FVC ratio at 80%---> 78%  TLC 2.97 L at 57% predicted  VC 1.7L at 51% predicted  RV/TLC at 43% predicted  DLCO 13.9 at 52% predicted  DLCO/VA 5.26L at 133 % predicted    Overall: Proportionate reduction in flow measurements without significant reversal; moderate restriction; moderate reduction in diffusing capacity that normalizes when averaged over lung volumes (compared to the study in August 2017 vital capacity has significantly improved, otherwise there has been no significant change)    Images and reports from chest imaging were reviewed by me. My interpretation is:  CXR (3/14/17): Hilar congestion; vascular prominence  Chest CT (8/14/17): Prominent mediastinal and hilar nodes; tiny left upper lobe nodule (findings are unchanged from January 2014)     ECHO (3/15/17): Summary   Normal LV size and systolic function. Estimated ejection fraction is 60%.   The left atrium is normal in size.   Normal right ventricular size and function. Lab Results   Component Value Date    WBC 7.5 03/14/2017    HGB 10.8 (L) 03/14/2017    HCT 38.5 03/14/2017    MCV 74.7 (L) 03/14/2017     03/14/2017       Lab Results   Component Value Date    BNP 1,710 02/23/2013       Lab Results   Component Value Date    CREATININE 0.8 03/17/2017    BUN 27 (H) 03/17/2017     (L) 03/17/2017    K 3.9 03/17/2017    CL 88 (L) 03/17/2017    CO2 34 (H) 03/17/2017   IgE- 501  Allergies to cats, dogs, cockroaches, dust mites, box elder trees, [place] trees and ragweed      Assessment/Plan:48y.o. year old female presents for follow up. Asthma- Mild intermittent. Will stepdown therapy to Arnuity 200 mcg daily. We discussed that if she develops respiratory symptoms once on Arnuity she should let me know and we will resume Breo. Continue albuterol inhaler and nebulizers as needed. Environmental allergies- She may use over-the-counter antihistamines as needed. Obstructive sleep apnea- She is on CPAP with 3 L of oxygen. She follows with Dr. Meliza Ovalle. She will return for follow-up in 6 months.       Ena Pearson MD  [facility] Pulmonology, Critical Care and Sleep

## 2020-12-16 NOTE — PATIENT INSTRUCTIONS
Please use Arnuity daily.  Please let me know whether it helps or not     Please come for a follow-up in 6 months

## 2021-06-09 ENCOUNTER — OFFICE VISIT (OUTPATIENT)
Dept: PULMONOLOGY | Age: 56
End: 2021-06-09
Payer: COMMERCIAL

## 2021-06-09 VITALS
TEMPERATURE: 97.1 F | RESPIRATION RATE: 16 BRPM | DIASTOLIC BLOOD PRESSURE: 70 MMHG | HEART RATE: 70 BPM | HEIGHT: 65 IN | WEIGHT: 239 LBS | OXYGEN SATURATION: 93 % | SYSTOLIC BLOOD PRESSURE: 130 MMHG | BODY MASS INDEX: 39.82 KG/M2

## 2021-06-09 DIAGNOSIS — G47.33 OSA (OBSTRUCTIVE SLEEP APNEA): ICD-10-CM

## 2021-06-09 DIAGNOSIS — J45.20 MILD INTERMITTENT ASTHMA WITHOUT COMPLICATION: Primary | ICD-10-CM

## 2021-06-09 DIAGNOSIS — Z91.09 ENVIRONMENTAL ALLERGIES: ICD-10-CM

## 2021-06-09 PROCEDURE — 99213 OFFICE O/P EST LOW 20 MIN: CPT | Performed by: INTERNAL MEDICINE

## 2021-06-09 RX ORDER — ALBUTEROL SULFATE 90 UG/1
2 AEROSOL, METERED RESPIRATORY (INHALATION) EVERY 4 HOURS PRN
Qty: 1 INHALER | Refills: 5 | Status: SHIPPED | OUTPATIENT
Start: 2021-06-09

## 2021-06-09 ASSESSMENT — ASTHMA QUESTIONNAIRES
QUESTION_3 LAST FOUR WEEKS HOW OFTEN DID YOUR ASTHMA SYMPTOMS (WHEEZING, COUGHING, SHORTNESS OF BREATH, CHEST TIGHTNESS OR PAIN) WAKE YOU UP AT NIGHT OR EARLIER THAN USUAL IN THE MORNING: 5
QUESTION_4 LAST FOUR WEEKS HOW OFTEN HAVE YOU USED YOUR RESCUE INHALER OR NEBULIZER MEDICATION (SUCH AS ALBUTEROL): 4
QUESTION_1 LAST FOUR WEEKS HOW MUCH OF THE TIME DID YOUR ASTHMA KEEP YOU FROM GETTING AS MUCH DONE AT WORK, SCHOOL OR AT HOME: 5
QUESTION_2 LAST FOUR WEEKS HOW OFTEN HAVE YOU HAD SHORTNESS OF BREATH: 4
ACT_TOTALSCORE: 22
QUESTION_5 LAST FOUR WEEKS HOW WOULD YOU RATE YOUR ASTHMA CONTROL: 4

## 2021-06-09 NOTE — PROGRESS NOTES
Chief complaint  This is a 55y.o. year old female  who presents with a chief complaint of   Chief Complaint   Patient presents with    Asthma     f/u Asthma       HPI  56-year-old woman with asthma, environmental allergies, pulmonary embolus (in ), obstructive sleep apnea and obesity (status post gastric sleeve in ) presents for follow-up. She says she has been doing well. She has rare episodes of shortness of breath. She denies cough or wheezing. At her last visit she was changed to [id number] [employer] but she has resumed Breo because she felt Arnuity did not work as well. She uses her albuterol inhaler about once every 2 weeks. She has not had to use her nebulizer. She says she has not been using her CPAP with bleed in oxygen.       Past Medical History:   Diagnosis Date    Anemia     Chronic back pain     DVT     Hyperlipidemia     Hypertension     Lumbar disc disease     Morbid obesity with BMI of 40.0-44.9, adult ([facility] [address] 2013    JOHN on CPAP     Oxygen desaturation during sleep     uSES 3 LITERS WITH CPAP AT NIGHT    PE (pulmonary embolism)     Personal history of PE (pulmonary embolism)     Pneumonia     Pulmonary hypertension (HCC)     Type II or unspecified type diabetes mellitus without mention of complication, not stated as uncontrolled        Past Surgical History:   Procedure Laterality Date     SECTION      SLEEVE GASTRECTOMY  14       Current Outpatient Medications   Medication Sig Dispense Refill    albuterol sulfate HFA (PROAIR HFA) 108 (90 Base) MCG/ACT inhaler Inhale 2 puffs into the lungs every 4 hours as needed for Wheezing or Shortness of Breath 1 Inhaler 5    Fluticasone furoate-vilanterol (BREO ELLIPTA) 200-25 MCG/INH AEPB inhaler Inhale 1 puff into the lungs daily 1 each 5    losartan (COZAAR) 50 MG tablet TAKE ONE TABLET BY MOUTH DAILY 90 tablet 0    MELOXICAM PO Take 1 tablet by mouth daily      NONFORMULARY Back medication for pain once daily      albuterol (PROVENTIL) (2.5 MG/3ML) 0.083% nebulizer solution Take 3 mLs by nebulization every 4 hours as needed for Wheezing or Shortness of Breath 180 mL 5    furosemide (LASIX) 20 MG tablet TAKE ONE TABLET BY MOUTH DAILY 30 tablet 5    ibuprofen (ADVIL;MOTRIN) 800 MG tablet       aspirin 81 MG tablet Take 81 mg by mouth daily      potassium chloride (KLOR-CON M) 20 MEQ extended release tablet Take 1 tablet by mouth daily 90 tablet 1    ferrous sulfate 325 (65 FE) MG tablet Take 325 mg by mouth daily (with breakfast)      Multiple Vitamins-Minerals (THERAPEUTIC MULTIVITAMIN-MINERALS) tablet Take 2 tablets by mouth daily      glipiZIDE (GLUCOTROL) 5 MG tablet Take 5 mg by mouth 2 times daily (before meals)      traZODone (DESYREL) 50 MG tablet Take 50 mg by mouth nightly      BD ULTRA-FINE PEN NEEDLES 29G X 12.7MM MISC       INS SYRINGE/NEEDLE 1CC/29G ([employer] INS SYRINGE 1CC/29G) 29G X 1/2\" 1 ML MISC by Does not apply route. Use with insulin nightly. 100 each 2     No current facility-administered medications for this visit.        Allergies   Allergen Reactions    Lisinopril Other (See Comments)     coughing    Shellfish-Derived Products        Family History   Problem Relation Age of Onset    Diabetes Sister     Diabetes Brother     Diabetes Mother     High Blood Pressure Father     Cancer Paternal Grandmother        Social History     Socioeconomic History    Marital status:      Spouse name: Not on file    Number of children: Not on file    Years of education: Not on file    Highest education level: Not on file   Occupational History    Not on file   Tobacco Use    Smoking status: Never Smoker    Smokeless tobacco: Never Used   Vaping Use    Vaping Use: Never used   Substance and Sexual Activity    Alcohol use: No    Drug use: No    Sexual activity: Not Currently     Comment:  1 DAUGHTER   Other Topics Concern    Not on file   Social History Narrative    Not on file     Social Determinants of Health     Financial Resource Strain:     Difficulty of Paying Living Expenses:    Food Insecurity:     Worried About Running Out of Food in the Last Year:     [address] in the Last Year:    Transportation Needs:     Lack of Transportation (Medical):  Lack of Transportation (Non-Medical):    Physical Activity:     Days of Exercise per Week:     Minutes of Exercise per Session:    Stress:     Feeling of Stress :    Social Connections:     Frequency of Communication with Friends and Family:     Frequency of Social Gatherings with Friends and Family:     Attends Evangelical Services:     Active Member of Clubs or Organizations:     Attends Club or Organization Meetings:     Marital Status:    Intimate Partner Violence:     Fear of Current or Ex-Partner:     Emotionally Abused:     Physically Abused:     Sexually Abused:        Immunization History   Administered Date(s) Administered    BCG ([place] BCG) 03/12/2010    COVID-19, Moderna, PF, 100mcg/0.5mL 04/08/2021, 05/06/2021    Influenza 10/06/2010, 10/24/2011, 10/05/2015    Influenza Vaccine, unspecified formulation 09/14/2016, 12/16/2016    Influenza Virus Vaccine 01/23/2014    Influenza Whole 12/11/2007    MMR 05/24/2004    Meningococcal MCV4P (Menactra) 01/16/2011    Pneumococcal Polysaccharide ([id number]) 09/08/2014    Td, unspecified formulation 05/24/2004       ROS:  GENERAL:  No fevers, no chills, no weight loss or gain    RESPIRATORY:  +ocasional shortness of breath, no wheezing, no cough      PHYSICAL EXAM:  Vitals:    06/09/21 0802   BP: 130/70   Pulse: 70   Resp: 16   Temp: 97.1 °F (36.2 °C)   SpO2: 93%   on RA    Gen: Well developed; well nourished  Eyes: No scleral icterus. No conjunctival injection. ENT:  Oropharynx clear. External appearance of ears and nose normal.  Neck: Trachea midline. No obvious mass.  No visible thyroid enlargement    Respiratory: Clear to auscultation bilaterally, no accessory muscle use  Cardiovascular: Regular rate and rhythm, no appreciable murmurs. No edema. Gastrointestinal: Soft, non-tender. No hernia  Skin: Warm and dry. No rashes or ulcers on visible areas. Normal texture and turgor  Lymphatic: No cervical LAD. No supraclavicular LAD. Musculoskeletal: No cyanosis, clubbing or joint deformity. Psychiatric: Normal mood and affect; exhibits normal insight and judgement     Data reviewed:  Pulmonary Function Testing (4/3/18)  FVC 1.6 L at 54% predicted ---> 1.68 (56%)  FEV1 1.29 L at 54% predicted----> 1.32 (55%)  FEV1/FVC ratio at 80%---> 78%  TLC 2.97 L at 57% predicted  VC 1.7L at 51% predicted  RV/TLC at 43% predicted  DLCO 13.9 at 52% predicted  DLCO/VA 5.26L at 133 % predicted    Overall: Proportionate reduction in flow measurements without significant reversal; moderate restriction; moderate reduction in diffusing capacity that normalizes when averaged over lung volumes (compared to the study in August 2017 vital capacity has significantly improved, otherwise there has been no significant change)    Images and reports from chest imaging were reviewed by me. My interpretation is:  CXR (3/14/17): Hilar congestion; vascular prominence  Chest CT (8/14/17): Prominent mediastinal and hilar nodes; tiny left upper lobe nodule (findings are unchanged from January 2014)     ECHO (3/15/17): Summary   Normal LV size and systolic function. Estimated ejection fraction is 60%.   The left atrium is normal in size.   Normal right ventricular size and function.     Lab Results   Component Value Date    WBC 7.5 03/14/2017    HGB 10.8 (L) 03/14/2017    HCT 38.5 03/14/2017    MCV 74.7 (L) 03/14/2017     03/14/2017       Lab Results   Component Value Date    BNP 1,710 02/23/2013       Lab Results   Component Value Date    CREATININE 0.8 03/17/2017    BUN 27 (H) 03/17/2017     (L) 03/17/2017    K 3.9 03/17/2017    CL 88 (L) 03/17/2017    CO2 34 (H) 03/17/2017   IgE- 501  Allergies to cats, dogs, cockroaches, dust mites, box elder trees, [place] trees and ragweed      Assessment/Plan:55y.o. year old female presents for follow up. Asthma- Stable. She will continue Breo 200/25 mcg daily. Continue albuterol inhaler and nebulizers as needed. Environmental allergies-Stable. Singulair was discontinued due to the association with depression. We discussed that she may use over-the-counter antihistamines as needed. Obstructive sleep apnea- She is no longer using CPAP with bleed in oxygen. She will follow up with Dr. Shimon Stevens. She is to return for follow up with Dr. Shimon Stevens.        Belinda Morse MD  [facility] Pulmonology, Critical Care and Sleep

## 2021-09-17 ENCOUNTER — OFFICE VISIT (OUTPATIENT)
Dept: PULMONOLOGY | Age: 56
End: 2021-09-17
Payer: COMMERCIAL

## 2021-09-17 VITALS
HEART RATE: 64 BPM | SYSTOLIC BLOOD PRESSURE: 128 MMHG | BODY MASS INDEX: 39.92 KG/M2 | WEIGHT: 239.6 LBS | DIASTOLIC BLOOD PRESSURE: 80 MMHG | HEIGHT: 65 IN | OXYGEN SATURATION: 86 % | TEMPERATURE: 97.6 F | RESPIRATION RATE: 16 BRPM

## 2021-09-17 DIAGNOSIS — G47.33 OSA (OBSTRUCTIVE SLEEP APNEA): ICD-10-CM

## 2021-09-17 DIAGNOSIS — J45.30 MILD PERSISTENT ASTHMA WITHOUT COMPLICATION: ICD-10-CM

## 2021-09-17 PROCEDURE — 99214 OFFICE O/P EST MOD 30 MIN: CPT | Performed by: INTERNAL MEDICINE

## 2021-09-17 ASSESSMENT — ASTHMA QUESTIONNAIRES
QUESTION_3 LAST FOUR WEEKS HOW OFTEN DID YOUR ASTHMA SYMPTOMS (WHEEZING, COUGHING, SHORTNESS OF BREATH, CHEST TIGHTNESS OR PAIN) WAKE YOU UP AT NIGHT OR EARLIER THAN USUAL IN THE MORNING: 4
QUESTION_5 LAST FOUR WEEKS HOW WOULD YOU RATE YOUR ASTHMA CONTROL: 3
QUESTION_4 LAST FOUR WEEKS HOW OFTEN HAVE YOU USED YOUR RESCUE INHALER OR NEBULIZER MEDICATION (SUCH AS ALBUTEROL): 5
QUESTION_2 LAST FOUR WEEKS HOW OFTEN HAVE YOU HAD SHORTNESS OF BREATH: 4
ACT_TOTALSCORE: 21
QUESTION_1 LAST FOUR WEEKS HOW MUCH OF THE TIME DID YOUR ASTHMA KEEP YOU FROM GETTING AS MUCH DONE AT WORK, SCHOOL OR AT HOME: 5

## 2021-09-17 NOTE — PROGRESS NOTES
REASON FOR CONSULTATION/CC: JOHN       PCP: SIMONE López MD    HISTORY OF PRESENT ILLNESS: Maricel Baird is a 55y.o. year old female with a history of JOHN who presents :    History of Asthma, allgeries, JOHN, PE 2011    Sleep history:       JOHN. Here for supplies  complains of mask fit. Asthma  ACT 21.          [place] Sleepiness Scale:    Sleep Medicine 1/11/2018 7/17/2017   Sitting and reading 1 3   Watching TV 1 3   Sitting, inactive in a public place (e.g. a theatre or a meeting) 1 0   As a passenger in a car for an hour without a break 3 3   Lying down to rest in the afternoon when circumstances permit 1 3   Sitting and talking to someone 1 3   Sitting quietly after a lunch without alcohol 1 3   In a car, while stopped for a few minutes in traffic 1 1   Total score 10 19   Neck circumference 14.5 14         0 = no chance of dozing  1 = slight chance of dozing  2 = moderate chance of dozing  3 = high chance of dozing    Interpretation:   0-7: It is unlikely that you are abnormally sleepy. 8-9:You have an average amount of daytime sleepiness. 10-15: You may be excessively sleepy depending on the situation. You may want to consider   seeking medical attention. 16-24: You are excessively sleepy and should consider seeking medical attention     REVIEW OF SYSTEMS:  Constitutional: Negative for fever    HENT: Negative for sore throat  Eyes: Negative for redness   Respiratory: Negative for dyspnea, cough  Cardiovascular: Negative for chest pain      Skin: Negative for rash  Neurological: Negative for syncope  Hematological: Negative for adenopathy  Psychiatric/Behavorial: Negative for anxiety    Objective:   PHYSICAL EXAM:  Blood pressure 128/80, pulse 64, temperature 97.6 °F (36.4 °C), temperature source Infrared, resp. rate 16, height 5' 5\" (1.651 m), weight 239 lb 9.6 oz (108.7 kg), last menstrual period 10/17/2018, SpO2 (!) 86 %, not currently breastfeeding.'  Gen: No distress.        ENT: No discharge. Pharynx clear. External appearance of ears and nose normal. Mallampati  3     Resp: No accessory muscle use. No crackles. No wheezes. No rhonchi. CV: Regular rate. Regular rhythm. No murmur or rub. No edema. Neuro: Moves all four extremities. Psych: Oriented x 3. No anxiety. Awake. Alert. Intact judgement and insight. Data Reviewed:      Assessment:     ·  JOHN  · Hypersomnia  ·  nocturnal hypoxema  ·   Asthma     Plan:           Problem List Items Addressed This Visit     JOHN (obstructive sleep apnea)      Natalie Naylor  is deriving benefit from PAP demonstrated by improved [place], AHI, symptoms. PAP download information:  Usage > 4 hours  98 %   Pressure setting  22/15 cwp    AHI with usage  1.7        Discussed need for supplies and proper cleaning. The patient expressed understanding for all. She is complains of pressure on skin and face with pressure.   After review of many mask, she would like to try [employer] Total Face Mask         Mild persistent asthma without complication      Well-controlled, continue current medications

## 2021-09-18 PROBLEM — J45.30 MILD PERSISTENT ASTHMA WITHOUT COMPLICATION: Status: ACTIVE | Noted: 2021-09-18
